# Patient Record
Sex: MALE | Race: BLACK OR AFRICAN AMERICAN | NOT HISPANIC OR LATINO | URBAN - METROPOLITAN AREA
[De-identification: names, ages, dates, MRNs, and addresses within clinical notes are randomized per-mention and may not be internally consistent; named-entity substitution may affect disease eponyms.]

---

## 2017-06-22 ENCOUNTER — EMERGENCY (EMERGENCY)
Facility: HOSPITAL | Age: 32
LOS: 1 days | Discharge: HOME | End: 2017-06-22
Attending: EMERGENCY MEDICINE | Admitting: EMERGENCY MEDICINE
Payer: SELF-PAY

## 2017-06-22 VITALS
HEART RATE: 93 BPM | DIASTOLIC BLOOD PRESSURE: 75 MMHG | OXYGEN SATURATION: 100 % | SYSTOLIC BLOOD PRESSURE: 128 MMHG | TEMPERATURE: 99 F | RESPIRATION RATE: 16 BRPM

## 2017-06-22 VITALS
TEMPERATURE: 100 F | SYSTOLIC BLOOD PRESSURE: 134 MMHG | OXYGEN SATURATION: 100 % | DIASTOLIC BLOOD PRESSURE: 82 MMHG | HEART RATE: 15 BPM | RESPIRATION RATE: 15 BRPM

## 2017-06-22 LAB — HETEROPH AB TITR SER AGGL: NEGATIVE — SIGNIFICANT CHANGE UP

## 2017-06-22 PROCEDURE — 99284 EMERGENCY DEPT VISIT MOD MDM: CPT

## 2017-06-22 RX ORDER — PENICILLIN G BENZATHINE 1200000 [IU]/2ML
1.2 INJECTION, SUSPENSION INTRAMUSCULAR ONCE
Qty: 0 | Refills: 0 | Status: DISCONTINUED | OUTPATIENT
Start: 2017-06-22 | End: 2017-06-22

## 2017-06-22 RX ORDER — DEXAMETHASONE 0.5 MG/5ML
10 ELIXIR ORAL ONCE
Qty: 0 | Refills: 0 | Status: COMPLETED | OUTPATIENT
Start: 2017-06-22 | End: 2017-06-22

## 2017-06-22 RX ORDER — PENICILLIN G BENZATHINE 1200000 [IU]/2ML
1.2 INJECTION, SUSPENSION INTRAMUSCULAR ONCE
Qty: 0 | Refills: 0 | Status: COMPLETED | OUTPATIENT
Start: 2017-06-22 | End: 2017-06-22

## 2017-06-22 RX ORDER — KETOROLAC TROMETHAMINE 30 MG/ML
30 SYRINGE (ML) INJECTION ONCE
Qty: 0 | Refills: 0 | Status: DISCONTINUED | OUTPATIENT
Start: 2017-06-22 | End: 2017-06-22

## 2017-06-22 RX ORDER — SODIUM CHLORIDE 9 MG/ML
1000 INJECTION INTRAMUSCULAR; INTRAVENOUS; SUBCUTANEOUS ONCE
Qty: 0 | Refills: 0 | Status: COMPLETED | OUTPATIENT
Start: 2017-06-22 | End: 2017-06-22

## 2017-06-22 RX ORDER — ACETAMINOPHEN 500 MG
975 TABLET ORAL ONCE
Qty: 0 | Refills: 0 | Status: COMPLETED | OUTPATIENT
Start: 2017-06-22 | End: 2017-06-22

## 2017-06-22 RX ADMIN — SODIUM CHLORIDE 1000 MILLILITER(S): 9 INJECTION INTRAMUSCULAR; INTRAVENOUS; SUBCUTANEOUS at 11:35

## 2017-06-22 RX ADMIN — Medication 30 MILLIGRAM(S): at 12:12

## 2017-06-22 RX ADMIN — Medication 975 MILLIGRAM(S): at 13:03

## 2017-06-22 RX ADMIN — SODIUM CHLORIDE 1000 MILLILITER(S): 9 INJECTION INTRAMUSCULAR; INTRAVENOUS; SUBCUTANEOUS at 13:03

## 2017-06-22 RX ADMIN — Medication 30 MILLIGRAM(S): at 11:42

## 2017-06-22 RX ADMIN — PENICILLIN G BENZATHINE 1.2 MILLION UNIT(S): 1200000 INJECTION, SUSPENSION INTRAMUSCULAR at 13:03

## 2017-06-22 RX ADMIN — Medication 102 MILLIGRAM(S): at 11:42

## 2017-06-22 NOTE — ED PROVIDER NOTE - OBJECTIVE STATEMENT
33 y/o M pt with no significant PMHx presents to the ED for throat pain x4 days, difficulty swallowing, and change in voice. Also states generalized weakness. Denies fever or cough. NKDA.

## 2017-06-22 NOTE — ED PROVIDER NOTE - CARE PLAN
Principal Discharge DX:	Tonsillitis  Instructions for follow-up, activity and diet:	Please drink plenty of fluids, take Tylenol 650mg every 6hrs and Motrin 600mg every 8hrs as needed for pain, you may also do warm water/salt or 1/2 water 1/2 hydrogen peroxide gurgles.   Also suck on popsicles as they will numb back of your throat. Follow up with your doctor within next 48hrs. Return to er for severe pain, inability to eat/drink, difficulty breathing, sever headache with neck stiffness and fever. you received the antibiotics today as well as steroids to help with infection and reduce the swelling.

## 2017-06-22 NOTE — ED PROVIDER NOTE - PROGRESS NOTE DETAILS
I performed the initial face to face bedside interview with this patient regarding history of present illness, review of symptoms and past medical, social and family history.  I completed an independent physical examination.  I was the initial provider who evaluated this patient.  The history, review of symptoms and examination was documented by the scribe in my presence and I attest to the accuracy of the documentation.  I have signed out the follow up of any pending tests (i.e. labs, radiological studies) to the PA.  I have discussed the patient’s plan of care and disposition with the PA. WILBER PEREZ - received pt s/p Quids eval by Dr Mathur - sore throat, malaise, fever,poor po intake - will provide IV hydration, pain meds, +/- abx depending on mono spot. WILBER Solis - pt is feeling significantly better, eating and drinking now, vss, will dc.

## 2017-06-22 NOTE — ED PROVIDER NOTE - PLAN OF CARE
Please drink plenty of fluids, take Tylenol 650mg every 6hrs and Motrin 600mg every 8hrs as needed for pain, you may also do warm water/salt or 1/2 water 1/2 hydrogen peroxide gurgles.   Also suck on popsicles as they will numb back of your throat. Follow up with your doctor within next 48hrs. Return to er for severe pain, inability to eat/drink, difficulty breathing, sever headache with neck stiffness and fever. you received the antibiotics today as well as steroids to help with infection and reduce the swelling.

## 2017-06-22 NOTE — ED ADULT TRIAGE NOTE - CCCP TRG CHIEF CMPLNT
since Monday, unable to swallow, pain and swelling. Pt reports no fevers. NOT drooling in triage. No resp. compromise/sore throat